# Patient Record
(demographics unavailable — no encounter records)

---

## 2024-12-11 NOTE — PHYSICAL EXAM
[Alert] : alert [No Acute Distress] : no acute distress [Normal Sclera/Conjunctiva] : normal sclera/conjunctiva [EOMI] : extra ocular movement intact [No LAD] : no lymphadenopathy [No Respiratory Distress] : no respiratory distress [Clear to Auscultation] : lungs were clear to auscultation bilaterally [Normal S1, S2] : normal S1 and S2 [Regular Rhythm] : with a regular rhythm [Normal Bowel Sounds] : normal bowel sounds [Not Tender] : non-tender [Not Distended] : not distended [Soft] : abdomen soft [Normal Anterior Cervical Nodes] : no anterior cervical lymphadenopathy [No Clubbing, Cyanosis] : no clubbing  or cyanosis of the fingernails [No Rash] : no rash [Normal Reflexes] : deep tendon reflexes were 2+ and symmetric [Normal Affect] : the affect was normal [Normal Mood] : the mood was normal [de-identified] : left thyroid lobe nodule is palpable

## 2024-12-11 NOTE — PAST MEDICAL HISTORY
[Menstruating] : The patient is menstruating [Irregular Cycle Intervals] : are  irregular [Total Preg ___] : G[unfilled]

## 2024-12-11 NOTE — HISTORY OF PRESENT ILLNESS
[FreeTextEntry1] : 47 y.o. female with h/o hypothyroidism diagnosed at age 40 and thyroid nodules s/p cardiac transplant in 2011 presents for follow  up visit.   No acute events since the last visit.  She has been dealing with vertigo since October 2021. Doing better now and saw neurology and did PT. She reports change in vision and feels like a glowing sensation but now better since increase in BP. Did see ophthalmology. 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 She then went to [facility] for a second opinion. Now being monitored by nephrology.   She takes LT4 125 mcg daily every morning on empty stomach and waits about 1 hour before eating. Takes MVI at night. No fatigue. Did lose weight since last visit. Also reports joint and leg pains which are chronic. No heat intolerance. No change in bowel movements. No hair loss or change in skin. Had fall in 12/2020 and injured right knee. Did see ortho in 1/2021.   In regard to MNG, no neck complaints. No head or neck RT exposure.   Had FNA of left 3.2 cm echogenic nodule in February 2017 showing findings consistent with Hashimoto's disease ([place] category II).   Thyroid ultrasound in January 2020 showed enlarged thyroid gland with heterogenous echotexture. In the right showed areas of heterogenous nodularity are identified measuring up to 4 cm. On the left there is an area of nodularity at the lower pole measuring 3.6 cm.  Thyroid ultrasound in March 2021 showed increase in size of left lobe and increase in size of left nodule to 3.4 cm.  Thyroid ultrasound on 11/30/21 shows right nodule measuring 4 cm in the lower pole and 3.7 cm in the upper pole which appear stable. On the left upper pole nodule is 2.7 cm and lower pole nodule is 3.4 cm which is stable. There is an indeterminate structure adjacent to the lower pole of the left lobe measuring 1.4 cm.  Thyroid ultrasound on 12/13/22 shows stable right 4.4 cm now complex and stable left lower pole isoechoic nodule measuring 3.4 cm. No abnormal LNs.  Thyroid ultrasound on 8/28/23 showed right lobe heterogenous without focal lesion and on left stable mid to lower pole nodule measuring 3.2 cm.  Thyroid ultrasound on 8/28/24 showed decrease in size of left isoechoic nodule in the mid to lower pole which measures 1.9 cm X 1.4 cm X 1.9 cm. No abnormal LNs are seen.    In regard to prediabetes diagnosed in 12/2020 and obesity, she does likes carbs but reports decrease in portions. She has carbs with breakfast. She reports early dinner and not eating past 6 pm. No exercise but doing more walking/steps. Never started Jardiance since nephrology felt not needed at this time.   She started Wegovy in June 2023. Now taking Wegovy 2.4 mg SQ weekly. Reports some mild constipation. Lost 33 pounds so far.   Also takes MVI daily.  Currently following with nephrology given increase in creatinine and off Olmesartan and decrease in cyclosporine.    Reports mother has hypothyroidism. Father has Type 2 DM and HTN. Does have sleep apnea. Not using CPAP.   Did see rheumatology as per nephrology recommendation.

## 2024-12-11 NOTE — ASSESSMENT
[Carbohydrate Consistent Diet] : carbohydrate consistent diet [Levothyroxine] : The patient was instructed to take Levothyroxine on an empty stomach, separate from vitamins, and wait at least 30 minutes before eating [FreeTextEntry1] : 47 y.o. female with h/o hypothyroidism, MNG, prediabetes and obesity.  1. Hypothyroidism/Hashimoto's disease- Discussed pathophysiology including Hashimoto's disease and autoimmune etiology. Patient appears euthyroid.  Will continue Levothyroxine 125 mcg daily for now. Discussed proper intake of T4. Will check TFTs.   2. MNG- Discussed pathophysiology. Given benign FNA and that she is asymptomatic, will continue to monitor for now. Thyroid ultrasound from August 2024 was reviewed and left dominant nodule has decreased in size. Will repeat thyroid ultrasound in August 2025.   3. Obesity/Prediabetes- Hba1c was normal at 5.5% in August 2024 and will recheck today. Discussed medical risks associated with obesity. Encouraged a carbohydrate consistent diet and exercise. Patient did seek consultation with bariatric surgery.  Discussed option of adding SGLT-2 inhibitor given cardiac and renal benefits. Reviewed risks and benefits of SGLT-2 inhibitors. She will hold off on SGLT-2 inhibitor for now as per nephrology. Will continue Wegovy 2.4 mg SQ weekly. Reviewed risks and benefits of GLP-1 Mumtaz. She did see bariatric surgery in May 2020. Recommended medical therapy given h/o heart transplant and cardiology recommended to avoid elective surgery. Will consider switching Wegovy to Zepbound. She will also discuss with cardiology and nephrology.   4. HTN- BP is near goal. Will continue current medication. Follow up with cardiology  Follow up in 6 months.

## 2024-12-11 NOTE — REVIEW OF SYSTEMS
[Recent Weight Loss (___ Lbs)] : recent weight loss: [unfilled] lbs [SOB on Exertion] : shortness of breath on exertion [Nocturia] : nocturia [Joint Pain] : joint pain [Myalgia] : myalgia  [Hair Loss] : hair loss [Headaches] : headaches [Poor Balance] : poor balance [Easy Bruising] : a tendency for easy bruising [Negative] : Psychiatric [Fatigue] : no fatigue [Recent Weight Gain (___ Lbs)] : no recent weight gain [Dysphagia] : no dysphagia [Neck Pain] : no neck pain [Dysphonia] : no dysphonia [Irregular Menses] : regular menses [Dry Skin] : no dry skin [Dizziness] : no dizziness [Polydipsia] : no polydipsia [Swelling] : no swelling

## 2024-12-11 NOTE — DATA REVIEWED
[FreeTextEntry1] : Labs 7/13/2020 TSH 4.83 Free T4 1.2 BUN/cr 39/2.26  5/26/21 H/H 12/37.4 BUN/cr 32/1.67 calcium 10.2 Hba1c 6.1% TSH 0.96 Free T4 1.4  6/8/23 chol 199 HDL 60  tri 164 calcium 10.2 Cr 1.71 glucose 91 Hba1c 6%

## 2025-05-06 NOTE — DISCUSSION/SUMMARY
[de-identified] : This patient presents today with complaints of right ankle pain and swelling a few days after playing pickleball.  The physical exam x-rays and MRI reveals like an overuse injury/sprain to the right ankle.  She has been having continued pain and swelling for the last week.  I recommended a Medrol Dosepak for the patient as she cannot take NSAIDs.  Will see how she does with the Dosepak.  I will see her back in the office in 2 weeks if the symptoms have not resolved.  She should try to reduce activities and avoid any excessive weightbearing and ambulation for the next week.  Instructions are given regarding taking the Dosepak.  At least 30 minutes was spent performing the evaluation and management on today's office visit.  This includes but is not limited to preparing to see patient including review of any test results or outside medical records, obtaining and/or reviewing separately obtained history, performing examination and evaluation, counseling and educating the patient on their diagnosis and treatment recommendations, ordering medications, tests, or procedures, documenting clinical information in the electronic health record, independently interpreting results (not separately reported) and communicating results to the patient, and coordination of care.

## 2025-05-06 NOTE — HISTORY OF PRESENT ILLNESS
[de-identified] : This patient presents today for initial consultation regarding right ankle pain and swelling.  Patient had the pain since 23 April where 2 days after playing pickleball she noted severe swelling pain and discomfort with ambulation.  Pain level 8 out of 10.  She is noting pain with problems weightbearing.  She has been using crutches.  She denies any specific injury.  She taking Tylenol for the pain.  She did go for x-rays and an MRI of her ankle ordered by her primary care physician.  She presents today for orthopedic evaluation.

## 2025-05-06 NOTE — DISCUSSION/SUMMARY
[de-identified] : This patient presents today with complaints of right ankle pain and swelling a few days after playing pickleball.  The physical exam x-rays and MRI reveals like an overuse injury/sprain to the right ankle.  She has been having continued pain and swelling for the last week.  I recommended a Medrol Dosepak for the patient as she cannot take NSAIDs.  Will see how she does with the Dosepak.  I will see her back in the office in 2 weeks if the symptoms have not resolved.  She should try to reduce activities and avoid any excessive weightbearing and ambulation for the next week.  Instructions are given regarding taking the Dosepak.  At least 30 minutes was spent performing the evaluation and management on today's office visit.  This includes but is not limited to preparing to see patient including review of any test results or outside medical records, obtaining and/or reviewing separately obtained history, performing examination and evaluation, counseling and educating the patient on their diagnosis and treatment recommendations, ordering medications, tests, or procedures, documenting clinical information in the electronic health record, independently interpreting results (not separately reported) and communicating results to the patient, and coordination of care.

## 2025-05-06 NOTE — RETURN TO WORK/SCHOOL
[FreeTextEntry1] : Pt was seen on 4/29/25 for right ankle pain. Pt may remain out of work till 5/14/25. [FreeTextEntry2] : Jasvir Espinosa MD

## 2025-05-06 NOTE — HISTORY OF PRESENT ILLNESS
[de-identified] : This patient presents today for initial consultation regarding right ankle pain and swelling.  Patient had the pain since 23 April where 2 days after playing pickleball she noted severe swelling pain and discomfort with ambulation.  Pain level 8 out of 10.  She is noting pain with problems weightbearing.  She has been using crutches.  She denies any specific injury.  She taking Tylenol for the pain.  She did go for x-rays and an MRI of her ankle ordered by her primary care physician.  She presents today for orthopedic evaluation.

## 2025-05-06 NOTE — PHYSICAL EXAM
[de-identified] : The patient appears well nourished  and in no apparent distress.  The patient is alert and oriented to person, place, and time.   Affect and mood appear normal.    The head is normocephalic and atraumatic.  The eyes reveal normal sclera and extra ocular muscles are intact.   The neck appears normal with no jugular venous distention or masses noted.   Skin shows normal turgor with no evidence of eczema or psoriasis.  No respiratory distress noted.  The patient ambulates with an antalgic gait.  The right ankle has significant loss of range of motion.  She can only dorsiflex to neutral.  She has reduced plantarflexion to 30 degrees.  There is circumferential soft tissue swelling.  There is tenderness over the lateral ligaments.  No tenderness medially.  There is no ecchymosis.  There is no warmth or erythema.    There is no instability to inversion or to anterior drawer.  There is normal strength.  Sensation is intact. Pulses and capillary refill are normal.    No edema or lymphadenopathy noted.  [de-identified] : X-rays of the right ankle reviewed from an outside facility.  AP lateral and mortise views reveals joint spaces well-maintained.  No fracture dislocation or degenerative disease noted.  MRI report from outside facility was obtained ordered by the primary care physician.  There is tendinosis of the peroneus brevis and peroneus longus.  There is Achilles tendinosis.  There is ganglion cyst in the extensor retinaculum.  There is an ankle and subtalar joint effusion.

## 2025-05-06 NOTE — PHYSICAL EXAM
[de-identified] : The patient appears well nourished  and in no apparent distress.  The patient is alert and oriented to person, place, and time.   Affect and mood appear normal.    The head is normocephalic and atraumatic.  The eyes reveal normal sclera and extra ocular muscles are intact.   The neck appears normal with no jugular venous distention or masses noted.   Skin shows normal turgor with no evidence of eczema or psoriasis.  No respiratory distress noted.  The patient ambulates with an antalgic gait.  The right ankle has significant loss of range of motion.  She can only dorsiflex to neutral.  She has reduced plantarflexion to 30 degrees.  There is circumferential soft tissue swelling.  There is tenderness over the lateral ligaments.  No tenderness medially.  There is no ecchymosis.  There is no warmth or erythema.    There is no instability to inversion or to anterior drawer.  There is normal strength.  Sensation is intact. Pulses and capillary refill are normal.    No edema or lymphadenopathy noted.  [de-identified] : X-rays of the right ankle reviewed from an outside facility.  AP lateral and mortise views reveals joint spaces well-maintained.  No fracture dislocation or degenerative disease noted.  MRI report from outside facility was obtained ordered by the primary care physician.  There is tendinosis of the peroneus brevis and peroneus longus.  There is Achilles tendinosis.  There is ganglion cyst in the extensor retinaculum.  There is an ankle and subtalar joint effusion.

## 2025-05-09 NOTE — PHYSICAL EXAM
[No Acute Distress] : no acute distress [Well Nourished] : well nourished [Normal Affect] : the affect was normal [Normal Insight/Judgement] : insight and judgment were intact [de-identified] : right ankle swelling

## 2025-05-09 NOTE — PLAN
[FreeTextEntry1] : Gout, CKD I do not feel comfortable given NSAIDs to her given she has stage 4 CKD. She should be evaluated by nephrology prior to given a nephrotoxic agent. Will give her another medrol pack Check Uric Acid and renal function today

## 2025-05-09 NOTE — HISTORY OF PRESENT ILLNESS
[FreeTextEntry8] : 47-year-old female with a history of a heart transplant, CKD stage 4 and hypothyroidism who complains of right ankle pain. She played pickle ball on [employer]. 2 nights later her right ankle was swollen and very painful. Not able to bear weight. She went to urgent car and was given an  air cast and crutches. Saw ortho. MRI was fine. Gave her steroids for 6 days. 24 hours after completing the steroids the pain recurred. Went to the podiatrist who thinks she has gout. She was able to show him a uric acid level which was 11 on previous labs. She got a cortisone shot. Swelling improved but still has pain. Spoke to her transplant team who told her to come in and see if she can be prescribed colchicine 0.3mg She has not been able to work pictures of the ankle before the steroids were reviewed. Ankle very swollen

## 2025-05-22 NOTE — REVIEW OF SYSTEMS
[Recent Weight Loss (___ Lbs)] : recent weight loss: [unfilled] lbs [SOB on Exertion] : shortness of breath on exertion [Nocturia] : nocturia [Joint Pain] : joint pain [Myalgia] : myalgia  [Hair Loss] : hair loss [Headaches] : headaches [Poor Balance] : poor balance [Easy Bruising] : a tendency for easy bruising [Negative] : Psychiatric [Fatigue] : no fatigue [Recent Weight Gain (___ Lbs)] : no recent weight gain [Dysphagia] : no dysphagia [Neck Pain] : no neck pain [Dysphonia] : no dysphonia [Irregular Menses] : regular menses [Dry Skin] : no dry skin [Dizziness] : no dizziness [Polydipsia] : no polydipsia [Swelling] : swelling

## 2025-05-22 NOTE — PHYSICAL EXAM
[Alert] : alert [No Acute Distress] : no acute distress [Normal Sclera/Conjunctiva] : normal sclera/conjunctiva [EOMI] : extra ocular movement intact [No LAD] : no lymphadenopathy [No Respiratory Distress] : no respiratory distress [Clear to Auscultation] : lungs were clear to auscultation bilaterally [Normal S1, S2] : normal S1 and S2 [Regular Rhythm] : with a regular rhythm [Normal Bowel Sounds] : normal bowel sounds [Not Tender] : non-tender [Not Distended] : not distended [Soft] : abdomen soft [Normal Anterior Cervical Nodes] : no anterior cervical lymphadenopathy [No Clubbing, Cyanosis] : no clubbing  or cyanosis of the fingernails [No Rash] : no rash [Normal Reflexes] : deep tendon reflexes were 2+ and symmetric [Normal Affect] : the affect was normal [Normal Mood] : the mood was normal [de-identified] : left thyroid lobe nodule is palpable [de-identified] : right ankle edema

## 2025-05-22 NOTE — ASSESSMENT
[Carbohydrate Consistent Diet] : carbohydrate consistent diet [Levothyroxine] : The patient was instructed to take Levothyroxine on an empty stomach, separate from vitamins, and wait at least 30 minutes before eating [FreeTextEntry1] : 47 y.o. female with h/o hypothyroidism, MNG, prediabetes and obesity.  1. Hypothyroidism/Hashimoto's disease- Discussed pathophysiology including Hashimoto's disease and autoimmune etiology. Patient appears euthyroid.  Will continue Levothyroxine 112 mcg daily for now. Discussed proper intake of T4. Will check TFTs.   2. MNG- Discussed pathophysiology. Given benign FNA and that she is asymptomatic, will continue to monitor for now. Thyroid ultrasound from August 2024 was reviewed and left dominant nodule has decreased in size. Will repeat thyroid ultrasound in August 2025.   3. Obesity/Prediabetes- Hba1c was normal at 5.6% in December 2024. Discussed medical risks associated with obesity. Encouraged a carbohydrate consistent diet and exercise. Patient did seek consultation with bariatric surgery.  Discussed option of adding SGLT-2 inhibitor given cardiac and renal benefits. Reviewed risks and benefits of SGLT-2 inhibitors. She is taking Jardiance 25 mg daily as per nephrology. Will continue Zepbound 15 mg SQ weekly. Reviewed risks and benefits of GLP-1 Mumtaz. She did see bariatric surgery in May 2020. Recommended medical therapy given h/o heart transplant and cardiology recommended to avoid elective surgery.   4. HTN- BP is near goal. Will continue current medication. Follows with cardiology  5. Right ankle swelling and pain- Possible gout. She is currently on Prednisone 5 mg daily and low purine diet. She will follow up with rheumatology this week.   Follow up in 6 months.

## 2025-05-22 NOTE — PHYSICAL EXAM
[Alert] : alert [No Acute Distress] : no acute distress [Normal Sclera/Conjunctiva] : normal sclera/conjunctiva [EOMI] : extra ocular movement intact [No LAD] : no lymphadenopathy [No Respiratory Distress] : no respiratory distress [Clear to Auscultation] : lungs were clear to auscultation bilaterally [Normal S1, S2] : normal S1 and S2 [Regular Rhythm] : with a regular rhythm [Normal Bowel Sounds] : normal bowel sounds [Not Tender] : non-tender [Not Distended] : not distended [Soft] : abdomen soft [Normal Anterior Cervical Nodes] : no anterior cervical lymphadenopathy [No Clubbing, Cyanosis] : no clubbing  or cyanosis of the fingernails [No Rash] : no rash [Normal Reflexes] : deep tendon reflexes were 2+ and symmetric [Normal Affect] : the affect was normal [Normal Mood] : the mood was normal [de-identified] : left thyroid lobe nodule is palpable [de-identified] : right ankle edema

## 2025-05-22 NOTE — HISTORY OF PRESENT ILLNESS
[FreeTextEntry1] : 47 y.o. female with h/o hypothyroidism diagnosed at age 40 and thyroid nodules s/p cardiac transplant in 2011 presents for follow up visit.   Played pickleball on Easter Sunday later in the week developed right ankle pain and swelling. She went to ortho. She saw podiatry who thought may be related to gout. PCP treated her with steroids. She spoke with rheumatology and planning for imaging.  Will see rheumatology this Friday. Currently taking Prednisone 5 mg daily.   Considering renal transplant at [place].  She has been dealing with vertigo since October 2021. Doing better now and saw neurology and did PT. She reports change in vision and feels like a glowing sensation but now better since increase in BP. Did see ophthalmology. 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 She then went to [place] for a second opinion. Now being monitored by nephrology.   She takes LT4 112 mcg daily every morning on empty stomach and waits about 1 hour before eating. Takes MVI at night. No fatigue. Did lose weight since last visit. Also reports joint and leg pains which are chronic. No heat intolerance. No change in bowel movements. No hair loss or change in skin. Had fall in 12/2020 and injured right knee. Did see ortho in 1/2021.   In regard to MNG, no neck complaints. No head or neck RT exposure.   Had FNA of left 3.2 cm echogenic nodule in February 2017 showing findings consistent with Hashimoto's disease ([place] category II).   Thyroid ultrasound in January 2020 showed enlarged thyroid gland with heterogenous echotexture. In the right showed areas of heterogenous nodularity are identified measuring up to 4 cm. On the left there is an area of nodularity at the lower pole measuring 3.6 cm.  Thyroid ultrasound in March 2021 showed increase in size of left lobe and increase in size of left nodule to 3.4 cm.  Thyroid ultrasound on 11/30/21 shows right nodule measuring 4 cm in the lower pole and 3.7 cm in the upper pole which appear stable. On the left upper pole nodule is 2.7 cm and lower pole nodule is 3.4 cm which is stable. There is an indeterminate structure adjacent to the lower pole of the left lobe measuring 1.4 cm.  Thyroid ultrasound on 12/13/22 shows stable right 4.4 cm now complex and stable left lower pole isoechoic nodule measuring 3.4 cm. No abnormal LNs.  Thyroid ultrasound on 8/28/23 showed right lobe heterogenous without focal lesion and on left stable mid to lower pole nodule measuring 3.2 cm.  Thyroid ultrasound on 8/28/24 showed decrease in size of left isoechoic nodule in the mid to lower pole which measures 1.9 cm X 1.4 cm X 1.9 cm. No abnormal LNs are seen.    In regard to prediabetes diagnosed in 12/2020 and obesity, she does likes carbs but reports decrease in portions. She has carbs with breakfast. She reports early dinner and not eating past 6 pm. No exercise but doing more walking/steps.  She did start Jardiance 25 mg daily as per nephrology.    She started Wegovy in June 2023. She was taking Wegovy 2.4 mg SQ weekly and transitioned to Zepbound 15 mg SQ weekly. Denies constipation. Some belching and a little gassy. Lost 33 pounds so far.   Also takes MVI daily.     Reports mother has hypothyroidism. Father has Type 2 DM and HTN. Does have sleep apnea. Not using CPAP.   Did see rheumatology as per nephrology recommendation.

## 2025-05-22 NOTE — HISTORY OF PRESENT ILLNESS
[FreeTextEntry1] : 47 y.o. female with h/o hypothyroidism diagnosed at age 40 and thyroid nodules s/p cardiac transplant in 2011 presents for follow up visit.   Played pickleball on Easter Sunday later in the week developed right ankle pain and swelling. She went to ortho. She saw podiatry who thought may be related to gout. PCP treated her with steroids. She spoke with rheumatology and planning for imaging.  Will see rheumatology this Friday. Currently taking Prednisone 5 mg daily.   Considering renal transplant at [place] [facility].  She has been dealing with vertigo since October 2021. Doing better now and saw neurology and did PT. She reports change in vision and feels like a glowing sensation but now better since increase in BP. Did see ophthalmology. 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 She then went to [facility] for a second opinion. Now being monitored by nephrology.   She takes LT4 112 mcg daily every morning on empty stomach and waits about 1 hour before eating. Takes MVI at night. No fatigue. Did lose weight since last visit. Also reports joint and leg pains which are chronic. No heat intolerance. No change in bowel movements. No hair loss or change in skin. Had fall in 12/2020 and injured right knee. Did see ortho in 1/2021.   In regard to MNG, no neck complaints. No head or neck RT exposure.   Had FNA of left 3.2 cm echogenic nodule in February 2017 showing findings consistent with Hashimoto's disease ([place] category II).   Thyroid ultrasound in January 2020 showed enlarged thyroid gland with heterogenous echotexture. In the right showed areas of heterogenous nodularity are identified measuring up to 4 cm. On the left there is an area of nodularity at the lower pole measuring 3.6 cm.  Thyroid ultrasound in March 2021 showed increase in size of left lobe and increase in size of left nodule to 3.4 cm.  Thyroid ultrasound on 11/30/21 shows right nodule measuring 4 cm in the lower pole and 3.7 cm in the upper pole which appear stable. On the left upper pole nodule is 2.7 cm and lower pole nodule is 3.4 cm which is stable. There is an indeterminate structure adjacent to the lower pole of the left lobe measuring 1.4 cm.  Thyroid ultrasound on 12/13/22 shows stable right 4.4 cm now complex and stable left lower pole isoechoic nodule measuring 3.4 cm. No abnormal LNs.  Thyroid ultrasound on 8/28/23 showed right lobe heterogenous without focal lesion and on left stable mid to lower pole nodule measuring 3.2 cm.  Thyroid ultrasound on 8/28/24 showed decrease in size of left isoechoic nodule in the mid to lower pole which measures 1.9 cm X 1.4 cm X 1.9 cm. No abnormal LNs are seen.    In regard to prediabetes diagnosed in 12/2020 and obesity, she does likes carbs but reports decrease in portions. She has carbs with breakfast. She reports early dinner and not eating past 6 pm. No exercise but doing more walking/steps.  She did start Jardiance 25 mg daily as per nephrology.    She started Wegovy in June 2023. She was taking Wegovy 2.4 mg SQ weekly and transitioned to Zepbound 15 mg SQ weekly. Denies constipation. Some belching and a little gassy. Lost 33 pounds so far.   Also takes MVI daily.     Reports mother has hypothyroidism. Father has Type 2 DM and HTN. Does have sleep apnea. Not using CPAP.   Did see rheumatology as per nephrology recommendation.

## 2025-06-17 NOTE — ASSESSMENT
[FreeTextEntry1] : Saw multiple providers - ortho foot/ankle, podiatry, and received the PO and intra-articular steroids MRI ankle abnormal  Mild tenosynovitis of the peroneus brevis and peroneus longus tendons .Tendinosis of the Achilles tendon. Multiple ganglion cyst in the region of the extensor retinaculum, and posteriorly surrounding the os trigonum measuring up to 2.1 cm. Small ankle and subtalar joint effusion. Mild edema and fatty atrophy of the abductor digital minimi and quadratus plantae muscles, may reflect neuropathy/denervation.  She is here today having been on prednisone x nearly 2 months  persistent pain, edema of foot/ankle has worsening renal function and recent assessment for ?transplant no rash, no ETOH, no falls  >> possible acute gout in addition to acute trauma/overuse injuries following the game, considering it's ongoing and she's on multiple Rx  >> labs ordered to monitor disease activity and for medication side effects.  Will notify of results >> if DECT negative low suspicion for ongoing gout  >>> pt will see another ortho MD this week >> advised f/uv or 2nd opinjon podiatry  patient is aware of and in agreement with assessment and plans   labs ordered to monitor disease activity and for medication side effects.  Will notify of results  fu by phone to review data  patient is aware of and in agreement with assessment and plans

## 2025-06-17 NOTE — HISTORY OF PRESENT ILLNESS
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  she was told it's gout and given steroids in addition to the other immune suppressive Rx  now on prednisone 5mg still Persistent pain and has ankle edema medially now  Had CT duel energy Negative  has not had arthrocentesis Saw multiple providers - ortho foot/ankle, podiatry, and received the PO and intra-articular steroids MRI ankle abnormal  April 2025 when it started:  Mild tenosynovitis of the peroneus brevis and peroneus longus tendons .Tendinosis of the Achilles tendon. Multiple ganglion cyst in the region of the extensor retinaculum, and posteriorly surrounding the os trigonum measuring up to 2.1 cm. Small ankle and subtalar joint effusion. Mild edema and fatty atrophy of the abductor digital minimi and quadratus plantae muscles, may reflect neuropathy/denervation.  She is here today having been on prednisone x nearly 2 months  persistent pain, edema of foot/ankle has worsening renal function and recent assessment for ?transplant no rash, no ETOH, no falls

## 2025-06-17 NOTE — PHYSICAL EXAM
[General Appearance - Alert] : alert [FreeTextEntry1] : UE WNL   LLE: foot/ankle TTP ankle and subtalar joint when ranging joint  moderate cool medial swelling  [] : no rash [Skin Lesions] : no skin lesions [No Focal Deficits] : no focal deficits [Oriented To Time, Place, And Person] : oriented to person, place, and time

## 2025-06-17 NOTE — REASON FOR VISIT
Please follow up closely with your regular physician. Please return to the ED if your symptoms worsen or if you develop new or concerning symptoms.     Discharge Instructions  Head Injury    You have been seen today for a head injury. You were checked for serious problems, like bleeding on the brain, but these problems cannot always be found right away.  Due to this risk, you should not be alone for 24 hours after your injury.  Follow up with your regular physician in 3 days. If you are taking a blood thinner, such as aspirin, Pradaxa  (dabigatran), Coumadin  (warfarin), or Plavix  (clopidogrel), you are at especially high risk for immediate or delayed bleeding, and need to re-check with a physician in 24 hours, or sooner if any of the symptoms below happen.     Return to the Emergency Department if:    You are confused, have amnesia, or you are not acting right.    Your headache gets worse or you start to have a really bad headache even with your recommended treatment plan.    You vomit more than once.    You have a convulsion or seizure.    You have trouble walking.    You have weakness or paralysis in an arm or a leg.    You have blood or fluid coming from your ears or nose.    You have new symptoms or anything that worries you.    Sleeping:  It is okay for you to sleep, but someone should wake you up as instructed by your doctor, and someone should check on you at your usual time to wake up.     Activity:    Do not drive for at least 24 hours.    Do not drive if you have dizzy spells or trouble concentrating, or remembering things.    Do not return to any contact sports until cleared by your regular doctor.     Follow-up:  It is very important that you make an appointment with your clinic and go to the appointment.  If you do not follow-up with your regular doctor, it may result in missing an important development which could result in permanent injury or disability and/or lasting pain.  If there is any problem  keeping your appointment, call your doctor or return to the Emergency Department.    MORE INFORMATION:    Concussion:  A concussion is a minor head injury that may cause temporary problems with the way your brain works.  Some symptoms include:  confusion, amnesia, nausea and vomiting, dizziness, fatigue, memory or concentration problems, irritability and sleep problems.    CT Scans: Your evaluation today may have included a CT scan (CAT scan) to look for things like bleeding or a skull fracture (break).  CT scans involve radiation and too many CT scans can cause serious health problems like cancer, especially in children.  Because of this, your doctor may not have ordered a CT scan today if they think you are at low risk for a serious or life threatening problem.    If you were given a prescription for medicine here today, be sure to read all of the information (including the package insert) that comes with your prescription.  This will include important information about the medicine, its side effects, and any warnings that you need to know about.  The pharmacist who fills the prescription can provide more information and answer questions you may have about the medicine.  If you have questions or concerns that the pharmacist cannot address, please call or return to the Emergency Department.     Opioid Medication Information    Pain medications are among the most commonly prescribed medicines, so we are including this information for all our patients. If you did not receive pain medication or get a prescription for pain medicine, you can ignore it.     You may have been given a prescription for an opioid (narcotic) pain medicine and/or have received a pain medicine while here in the Emergency Department. These medicines can make you drowsy or impaired. You must not drive, operate dangerous equipment, or engage in any other dangerous activities while taking these medications. If you drive while taking these  medications, you could be arrested for DUI, or driving under the influence. Do not drink any alcohol while you are taking these medications.     Opioid pain medications can cause addiction. If you have a history of chemical dependency of any type, you are at a higher risk of becoming addicted to pain medications.  Only take these prescribed medications to treat your pain when all other options have been tried. Take it for as short a time and as few doses as possible. Store your pain pills in a secure place, as they are frequently stolen and provide a dangerous opportunity for children or visitors in your house to start abusing these powerful medications. We will not replace any lost or stolen medicine.  As soon as your pain is better, you should flush all your remaining medication.     Many prescription pain medications contain Tylenol  (acetaminophen), including Vicodin , Tylenol #3 , Norco , Lortab , and Percocet .  You should not take any extra pills of Tylenol  if you are using these prescription medications or you can get very sick.  Do not ever take more than 3000 mg of acetaminophen in any 24 hour period.    All opioids tend to cause constipation. Drink plenty of water and eat foods that have a lot of fiber, such as fruits, vegetables, prune juice, apple juice and high fiber cereal.  Take a laxative if you don t move your bowels at least every other day. Miralax , Milk of Magnesia, Colace , or Senna  can be used to keep you regular.      Remember that you can always come back to the Emergency Department if you are not able to see your regular doctor in the amount of time listed above, if you get any new symptoms, or if there is anything that worries you.          Discharge Instructions  Neck Strain    You have been seen today for a neck sprain or strain.  Neck strains usually result from an injury to the neck. Car accidents, contact sports and falls are common causes of neck strain. Sometimes your neck can  start to hurt because of increased activity, muscle tension, an abnormal sleeping position, or because of other problems like arthritis in the neck.     Neck pain usually comes from injured muscles and ligaments. Sometimes there is a herniated ( slipped ) disc. We don t usually do MRI scans to look for these right away, since most herniated discs will get better on their own with time. Today, we did not find any evidence that your neck pain was caused by a serious condition, such as an infection, fracture, or tumor. However, sometimes symptoms develop over time and cannot be found during an emergency visit, so it is very important that you follow up with your primary doctor.    Return to the Emergency Department if:    You have increasing pain in your neck.    You develop difficulty swallowing or breathing.    You have numbness, weakness, or trouble moving your arms or legs.    You have severe dizziness and difficulty walking.    You are unable to control your bladder or bowels.    You develop severe headache or ringing in the ears.    Call your doctor if:     Your neck pain is not controlled with the medicine we gave you.    You are not back to normal within 1 week.    What can I do to help myself at home?    If you had an injury, use cold for the first 1-2 days. Cold helps relieve pain and reduce inflammation.  Apply ice packs to the neck or areas of pain every 1-2 hours for 20 minutes at a time. Place a towel or cloth between your skin and the ice pack.    After the first 2 days, using heat can help with neck pain and stiffness. You may use a warm shower or bath, warm towels on the neck, or a heating pad. Do not sleep with a heating pad, as you can be burned.     Pain medications - You may take a pain medication such as Tylenol  (acetaminophen), Advil , Nuprin  (ibuprofen) or Aleve  (naproxen).  If you have been given a narcotic such as Vicodin  (hydrocodone with acetaminophen), Percocet  (oxycodone with  acetaminophen), codeine, or a muscle relaxant such as Flexeril  (cyclobenzaprine) or Soma  (carisoprodol), do not drive for four hours after you have taken it. If the narcotic contains Tylenol  (acetaminophen), do not take Tylenol  with it. All narcotics will cause constipation, so eat a high fiber diet.      It is usually best to rest the neck for 1-2 days after an injury, then start gentle stretching exercises.     It is helpful to place a small pillow under the nape of your neck to provide proper neutral positioning.     You should stay active and do your usual work as much as you can, unless this involves heavy physical labor. Ask your doctor if you need work restrictions.  If you were given a prescription for medicine here today, be sure to read all of the information (including the package insert) that comes with your prescription.  This will include important information about the medicine, its side effects, and any warnings that you need to know about.  The pharmacist who fills the prescription can provide more information and answer questions you may have about the medicine.  If you have questions or concerns that the pharmacist cannot address, please call or return to the Emergency Department.   Opioid Medication Information    Pain medications are among the most commonly prescribed medicines, so we are including this information for all our patients. If you did not receive pain medication or get a prescription for pain medicine, you can ignore it.     You may have been given a prescription for an opioid (narcotic) pain medicine and/or have received a pain medicine while here in the Emergency Department. These medicines can make you drowsy or impaired. You must not drive, operate dangerous equipment, or engage in any other dangerous activities while taking these medications. If you drive while taking these medications, you could be arrested for DUI, or driving under the influence. Do not drink any alcohol  while you are taking these medications.     Opioid pain medications can cause addiction. If you have a history of chemical dependency of any type, you are at a higher risk of becoming addicted to pain medications.  Only take these prescribed medications to treat your pain when all other options have been tried. Take it for as short a time and as few doses as possible. Store your pain pills in a secure place, as they are frequently stolen and provide a dangerous opportunity for children or visitors in your house to start abusing these powerful medications. We will not replace any lost or stolen medicine.  As soon as your pain is better, you should flush all your remaining medication.     Many prescription pain medications contain Tylenol  (acetaminophen), including Vicodin , Tylenol #3 , Norco , Lortab , and Percocet .  You should not take any extra pills of Tylenol  if you are using these prescription medications or you can get very sick.  Do not ever take more than 3000 mg of acetaminophen in any 24 hour period.    All opioids tend to cause constipation. Drink plenty of water and eat foods that have a lot of fiber, such as fruits, vegetables, prune juice, apple juice and high fiber cereal.  Take a laxative if you don t move your bowels at least every other day. Miralax , Milk of Magnesia, Colace , or Senna  can be used to keep you regular.      Remember that you can always come back to the Emergency Department if you are not able to see your regular doctor in the amount of time listed above, if you get any new symptoms, or if there is anything that worries you.       [Consultation] : a consultation visit [FreeTextEntry1] : ankle pain